# Patient Record
Sex: FEMALE | Race: WHITE | Employment: OTHER | URBAN - METROPOLITAN AREA
[De-identification: names, ages, dates, MRNs, and addresses within clinical notes are randomized per-mention and may not be internally consistent; named-entity substitution may affect disease eponyms.]

---

## 2018-07-23 ENCOUNTER — HOSPITAL ENCOUNTER (EMERGENCY)
Age: 58
Discharge: HOME | End: 2018-07-23
Attending: EMERGENCY MEDICINE
Payer: SELF-PAY

## 2018-07-23 VITALS
RESPIRATION RATE: 15 BRPM | DIASTOLIC BLOOD PRESSURE: 73 MMHG | OXYGEN SATURATION: 95 % | TEMPERATURE: 98.6 F | HEART RATE: 93 BPM | BODY MASS INDEX: 50.02 KG/M2 | SYSTOLIC BLOOD PRESSURE: 186 MMHG | WEIGHT: 293 LBS | HEIGHT: 64 IN

## 2018-07-23 DIAGNOSIS — R53.83 FATIGUE, UNSPECIFIED TYPE: Primary | ICD-10-CM

## 2018-07-23 DIAGNOSIS — Z91.89 SEDENTARY LIFESTYLE: ICD-10-CM

## 2018-07-23 LAB
ALBUMIN SERPL-MCNC: 3.4 G/DL (ref 3.5–5)
ALBUMIN/GLOB SERPL: 0.8 {RATIO} (ref 1.1–2.2)
ALP SERPL-CCNC: 119 U/L (ref 45–117)
ALT SERPL-CCNC: 32 U/L (ref 12–78)
ANION GAP SERPL CALC-SCNC: 5 MMOL/L (ref 5–15)
APPEARANCE UR: CLEAR
AST SERPL-CCNC: 22 U/L (ref 15–37)
BACTERIA URNS QL MICRO: NEGATIVE /HPF
BASOPHILS # BLD: 0 K/UL (ref 0–0.1)
BASOPHILS NFR BLD: 0 % (ref 0–1)
BILIRUB DIRECT SERPL-MCNC: <0.1 MG/DL (ref 0–0.2)
BILIRUB SERPL-MCNC: 0.2 MG/DL (ref 0.2–1)
BILIRUB UR QL: NEGATIVE
BUN SERPL-MCNC: 16 MG/DL (ref 6–20)
BUN/CREAT SERPL: 19 (ref 12–20)
CALCIUM SERPL-MCNC: 9 MG/DL (ref 8.5–10.1)
CHLORIDE SERPL-SCNC: 102 MMOL/L (ref 97–108)
CO2 SERPL-SCNC: 30 MMOL/L (ref 21–32)
COLOR UR: NORMAL
CREAT SERPL-MCNC: 0.83 MG/DL (ref 0.55–1.02)
DIFFERENTIAL METHOD BLD: ABNORMAL
EOSINOPHIL # BLD: 0.1 K/UL (ref 0–0.4)
EOSINOPHIL NFR BLD: 2 % (ref 0–7)
EPITH CASTS URNS QL MICRO: NORMAL /LPF
ERYTHROCYTE [DISTWIDTH] IN BLOOD BY AUTOMATED COUNT: 14.6 % (ref 11.5–14.5)
GLOBULIN SER CALC-MCNC: 4.2 G/DL (ref 2–4)
GLUCOSE SERPL-MCNC: 119 MG/DL (ref 65–100)
GLUCOSE UR STRIP.AUTO-MCNC: NEGATIVE MG/DL
HCT VFR BLD AUTO: 40.6 % (ref 35–47)
HGB BLD-MCNC: 13.2 G/DL (ref 11.5–16)
HGB UR QL STRIP: NEGATIVE
HYALINE CASTS URNS QL MICRO: NORMAL /LPF (ref 0–5)
IMM GRANULOCYTES # BLD: 0 K/UL (ref 0–0.04)
IMM GRANULOCYTES NFR BLD AUTO: 0 % (ref 0–0.5)
KETONES UR QL STRIP.AUTO: NEGATIVE MG/DL
LEUKOCYTE ESTERASE UR QL STRIP.AUTO: NEGATIVE
LYMPHOCYTES # BLD: 2.2 K/UL (ref 0.8–3.5)
LYMPHOCYTES NFR BLD: 25 % (ref 12–49)
MCH RBC QN AUTO: 28.2 PG (ref 26–34)
MCHC RBC AUTO-ENTMCNC: 32.5 G/DL (ref 30–36.5)
MCV RBC AUTO: 86.8 FL (ref 80–99)
MONOCYTES # BLD: 0.8 K/UL (ref 0–1)
MONOCYTES NFR BLD: 10 % (ref 5–13)
NEUTS SEG # BLD: 5.4 K/UL (ref 1.8–8)
NEUTS SEG NFR BLD: 63 % (ref 32–75)
NITRITE UR QL STRIP.AUTO: NEGATIVE
NRBC # BLD: 0 K/UL (ref 0–0.01)
NRBC BLD-RTO: 0 PER 100 WBC
PH UR STRIP: 7 [PH] (ref 5–8)
PLATELET # BLD AUTO: 299 K/UL (ref 150–400)
PMV BLD AUTO: 10.2 FL (ref 8.9–12.9)
POTASSIUM SERPL-SCNC: 4.5 MMOL/L (ref 3.5–5.1)
PROT SERPL-MCNC: 7.6 G/DL (ref 6.4–8.2)
PROT UR STRIP-MCNC: NEGATIVE MG/DL
RBC # BLD AUTO: 4.68 M/UL (ref 3.8–5.2)
RBC #/AREA URNS HPF: NORMAL /HPF (ref 0–5)
SODIUM SERPL-SCNC: 137 MMOL/L (ref 136–145)
SP GR UR REFRACTOMETRY: 1.01 (ref 1–1.03)
TSH SERPL DL<=0.05 MIU/L-ACNC: 1.95 UIU/ML (ref 0.36–3.74)
UR CULT HOLD, URHOLD: NORMAL
UROBILINOGEN UR QL STRIP.AUTO: 0.2 EU/DL (ref 0.2–1)
WBC # BLD AUTO: 8.5 K/UL (ref 3.6–11)
WBC URNS QL MICRO: NORMAL /HPF (ref 0–4)

## 2018-07-23 PROCEDURE — 84443 ASSAY THYROID STIM HORMONE: CPT | Performed by: EMERGENCY MEDICINE

## 2018-07-23 PROCEDURE — 80076 HEPATIC FUNCTION PANEL: CPT | Performed by: EMERGENCY MEDICINE

## 2018-07-23 PROCEDURE — 99284 EMERGENCY DEPT VISIT MOD MDM: CPT

## 2018-07-23 PROCEDURE — 93005 ELECTROCARDIOGRAM TRACING: CPT

## 2018-07-23 PROCEDURE — 36415 COLL VENOUS BLD VENIPUNCTURE: CPT | Performed by: EMERGENCY MEDICINE

## 2018-07-23 PROCEDURE — 80048 BASIC METABOLIC PNL TOTAL CA: CPT | Performed by: EMERGENCY MEDICINE

## 2018-07-23 PROCEDURE — 85025 COMPLETE CBC W/AUTO DIFF WBC: CPT | Performed by: EMERGENCY MEDICINE

## 2018-07-23 PROCEDURE — 81001 URINALYSIS AUTO W/SCOPE: CPT | Performed by: EMERGENCY MEDICINE

## 2018-07-23 RX ORDER — INSULIN PUMP SYRINGE, 3 ML
EACH MISCELLANEOUS
Qty: 1 KIT | Refills: 0 | Status: SHIPPED | OUTPATIENT
Start: 2018-07-23

## 2018-07-23 NOTE — ED PROVIDER NOTES
HPI Comments: 60 y.o. female with past medical history significant for afib who presents to the ED with chief complaint of fatigue. Pt reports intermittent fatigue onset a couple weeks ago accompanied by generalized weakness, dizziness, feeling \"off balance,\" increased urinary frequency, bilateral leg swelling x 1 year, and nocturia. Pt states her sx worsened over the last couple days. Pt states her dizziness is exacerbated by standing, says she has to hold onto things in order to prevent falling while ambulating. Pt states she has also gained about 60 lbs in the past year in her abdominal area despite a healthy diet but denies exercising. Pt states she was concerned about her thyroid function due to her weight gain and has not had it checked in over a year so she made an appointment to see an endocrinologist later this week. Pt states she was seen at [facility] ED for her sx a couple weeks ago and was noted to have an elevated BP (230's/130's) but no other abnormal findings at that time. Pt also notes she has had poor sleep quality recently and her  says she snores. Pt denies any significant family hx. There are no other acute medical complaints voiced at this time. Social Hx: Never smoker. Denies EtOH use. PCP: None    Note written by Padmini Pinto, as dictated by Santiago Marquez MD 5:22 PM     The history is provided by the patient. Past Medical History:   Diagnosis Date    Atrial fibrillation [facility])        History reviewed. No pertinent surgical history. History reviewed. No pertinent family history. Social History     Social History    Marital status: LEGALLY      Spouse name: N/A    Number of children: N/A    Years of education: N/A     Occupational History    Not on file.      Social History Main Topics    Smoking status: Never Smoker    Smokeless tobacco: Not on file    Alcohol use No    Drug use: Not on file    Sexual activity: Not on file     Other Topics Concern    Not on file     Social History Narrative         ALLERGIES: Clindamycin    Review of Systems   Constitutional: Positive for fatigue and unexpected weight change (gained about 60 lbs in the past year). Cardiovascular: Positive for leg swelling (bilateral). Negative for chest pain. Gastrointestinal: Negative for diarrhea and vomiting. Genitourinary: Positive for frequency. Musculoskeletal: Positive for gait problem (off balance). Negative for neck pain. Neurological: Positive for dizziness and weakness. All other systems reviewed and are negative. Vitals:    07/23/18 1611   BP: (!) 229/99   Pulse: 99   Resp: 17   Temp: 98.6 °F (37 °C)   SpO2: 100%   Weight: 133 kg (293 lb 3.4 oz)   Height: 5' 4\" (1.626 m)            Physical Exam   Constitutional: She appears well-developed and well-nourished. No distress. Obese. HENT:   Head: Normocephalic and atraumatic. Eyes: Conjunctivae are normal.   Neck: Neck supple. Cardiovascular: Normal rate and regular rhythm. Pulmonary/Chest: Effort normal. No stridor. No respiratory distress. Abdominal: She exhibits no distension. Musculoskeletal: Normal range of motion. She exhibits edema (1+ bilateral non-pitting edema ). Neurological: She is alert. Coordination normal.   Skin: Skin is warm and dry. Psychiatric: She has a normal mood and affect. Nursing note and vitals reviewed. Note written by Padmini Bautista, as dictated by Kendy Paniagua MD 5:23 PM    MDM    60 y.o. female presents with fatigue progressing over the last few weeks after being present for a few years. She describes her daily life as working from home with limited physical activity. No significant hematologic or metabolic abnormalities to explain symptoms. Requested screening for thyroid dysfunction is unremarkable. She is ambulatory without difficulty and no neuro deficits.  Hypertension present at triage improves without intervention but may require further outpatient follow up. Do not feel she requires further inpatient workup at this time. Plan to follow up with PCP as needed and return precautions discussed for worsening or new concerning symptoms. ED Course       Procedures    ED EKG interpretation:  Rhythm: normal sinus rhythm; and regular . Rate (approx.): 93; Axis: left axis deviation; minimal voltage criteria for LVH.      Note written by Padmini Hanna, as dictated by Linda Pascual MD 4:55 PM

## 2018-07-23 NOTE — DISCHARGE INSTRUCTIONS
Fatigue: Care Instructions  Your Care Instructions    Fatigue is a feeling of tiredness, exhaustion, or lack of energy. You may feel fatigue because of too much or not enough activity. It can also come from stress, lack of sleep, boredom, and poor diet. Many medical problems, such as viral infections, can cause fatigue. Emotional problems, especially depression, are often the cause of fatigue. Fatigue is most often a symptom of another problem. Treatment for fatigue depends on the cause. For example, if you have fatigue because you have a certain health problem, treating this problem also treats your fatigue. If depression or anxiety is the cause, treatment may help. Follow-up care is a key part of your treatment and safety. Be sure to make and go to all appointments, and call your doctor if you are having problems. It's also a good idea to know your test results and keep a list of the medicines you take. How can you care for yourself at home? · Get regular exercise. But don't overdo it. Go back and forth between rest and exercise. · Get plenty of rest.  · Eat a healthy diet. Do not skip meals, especially breakfast.  · Reduce your use of caffeine, tobacco, and alcohol. Caffeine is most often found in coffee, tea, cola drinks, and chocolate. · Limit medicines that can cause fatigue. This includes tranquilizers and cold and allergy medicines. When should you call for help? Watch closely for changes in your health, and be sure to contact your doctor if:    · You have new symptoms such as fever or a rash.     · Your fatigue gets worse.     · You have been feeling down, depressed, or hopeless. Or you may have lost interest in things that you usually enjoy.     · You are not getting better as expected. Where can you learn more? Go to http://hank-fahad.info/. Enter Y372 in the search box to learn more about \"Fatigue: Care Instructions. \"  Current as of: November 20, 2017  Content Version: 11.7  © 3151-8048 [employer]. Care instructions adapted under license by [employer] (which disclaims liability or warranty for this information). If you have questions about a medical condition or this instruction, always ask your healthcare professional. [address] any warranty or liability for your use of this information. Muscle Conditioning: Exercises  Your Care Instructions  Here are some examples of exercises for muscle conditioning. Start each exercise slowly. Ease off the exercise if you start to have pain. Your doctor or physical therapist will tell you when you can start these exercises and which ones will work best for you. How to do the exercises  Wall push-ups    When you can do this exercise against a wall comfortably (without your muscles feeling tired), you can try it against a counter. Start with 5 repetitions again and work up to 8 to 12. You can then slowly progress to the end of a couch or a sturdy chair, and finally to the floor. 1. Stand facing a wall, about 12 to 18 inches away. 2. Place your hands on the wall at shoulder height. 3. Slowly bend your elbows and bring your face toward the wall, moving your hips and shoulders forward together. 4. Push slowly back to the starting position. 5. Start with 5 repetitions and work up to 8 to 12. 6. Rest for a minute, and repeat the exercise. Knee extension    If this exercise becomes easy, you can add a light weight around your ankle or tie an elastic resistance band to a chair leg and one ankle. 1. While sitting in a chair, straighten one leg and hold while you slowly count to 5. Be sure you do not lock your knee. 2. Repeat 8 to 12 times. 3. Rest for a minute, and repeat the exercise. 4. Do the same exercise with the other leg.   Side-lying leg lift    If this exercise becomes easy, you can add a light weight around your ankle or tie an elastic resistance band to both ankles. 1. Lie on your side, with your legs extended. Keep your hips straight up and down during this exercise. Do not let your top hip rock toward the back. Support your head with your hand, and place the other hand on the floor near your waist.  2. Slowly raise your upper leg until it is about in line with your shoulder. Keep your toes pointed forward. 3. Slowly lower your leg to the starting position. 4. Repeat 8 to 12 times. 5. Rest for a minute, and repeat the exercise. 6. Turn to your other side and do the same exercise with your other leg. Shallow standing knee bends    1. Stand with your hands lightly resting on a counter or chair in front of you with your feet shoulder-width apart. 2. Slowly bend your knees so that you squat down just like you were going to sit in a chair. Make sure your knees do not go in front of your toes. 3. Lower yourself about 6 inches. Your heels should remain on the floor at all times. 4. Rise slowly to a standing position. 5. Repeat 8 to 12 times. 6. Rest for a minute, and repeat the exercise. Follow-up care is a key part of your treatment and safety. Be sure to make and go to all appointments, and call your doctor if you are having problems. It's also a good idea to know your test results and keep a list of the medicines you take. Where can you learn more? Go to http://hank-fahad.info/. Enter E914 in the search box to learn more about \"Muscle Conditioning: Exercises. \"  Current as of: December 7, 2017  Content Version: 11.7  © 3211-4695 [employer]. Care instructions adapted under license by [employer] (which disclaims liability or warranty for this information). If you have questions about a medical condition or this instruction, always ask your healthcare professional. [address] any warranty or liability for your use of this information.

## 2018-07-23 NOTE — ED TRIAGE NOTES
Pt reports feeling weak and dizziness. Sx for a couple weeks. Pt denies HTN however, BP in triage 229/99. Pt reports that a couiple of weeks ago had a high reading but it went right back down. Pt reports that she is very anxious.

## 2018-07-24 LAB
ATRIAL RATE: 93 BPM
CALCULATED P AXIS, ECG09: 58 DEGREES
CALCULATED R AXIS, ECG10: -40 DEGREES
CALCULATED T AXIS, ECG11: 23 DEGREES
DIAGNOSIS, 93000: NORMAL
P-R INTERVAL, ECG05: 146 MS
Q-T INTERVAL, ECG07: 370 MS
QRS DURATION, ECG06: 82 MS
QTC CALCULATION (BEZET), ECG08: 460 MS
VENTRICULAR RATE, ECG03: 93 BPM